# Patient Record
Sex: FEMALE | Race: WHITE | NOT HISPANIC OR LATINO | URBAN - METROPOLITAN AREA
[De-identification: names, ages, dates, MRNs, and addresses within clinical notes are randomized per-mention and may not be internally consistent; named-entity substitution may affect disease eponyms.]

---

## 2020-12-09 ENCOUNTER — NEW PATIENT (OUTPATIENT)
Dept: URBAN - METROPOLITAN AREA CLINIC 85 | Facility: CLINIC | Age: 75
End: 2020-12-09
Payer: OTHER GOVERNMENT

## 2020-12-09 PROCEDURE — 92004 COMPRE OPH EXAM NEW PT 1/>: CPT | Performed by: OPHTHALMOLOGY

## 2020-12-09 ASSESSMENT — INTRAOCULAR PRESSURE
OD: 17
OS: 21

## 2020-12-09 ASSESSMENT — VISUAL ACUITY
OS: 20/20
OD: 20/20

## 2020-12-09 ASSESSMENT — KERATOMETRY
OD: 43.00
OS: 43.50

## 2020-12-17 ENCOUNTER — NEW PATIENT (OUTPATIENT)
Dept: URBAN - METROPOLITAN AREA CLINIC 85 | Facility: CLINIC | Age: 75
End: 2020-12-17
Payer: OTHER GOVERNMENT

## 2020-12-17 DIAGNOSIS — H26.491 OTHER SECONDARY CATARACT, RIGHT EYE: Primary | ICD-10-CM

## 2020-12-17 PROCEDURE — 99203 OFFICE O/P NEW LOW 30 MIN: CPT | Performed by: PHYSICIAN ASSISTANT

## 2020-12-28 ENCOUNTER — SURGERY (OUTPATIENT)
Dept: URBAN - METROPOLITAN AREA SURGERY 55 | Facility: SURGERY | Age: 75
End: 2020-12-28
Payer: OTHER GOVERNMENT

## 2020-12-28 PROCEDURE — 66821 AFTER CATARACT LASER SURGERY: CPT | Performed by: OPHTHALMOLOGY

## 2021-01-11 ENCOUNTER — NEW PATIENT (OUTPATIENT)
Dept: URBAN - METROPOLITAN AREA CLINIC 85 | Facility: CLINIC | Age: 76
End: 2021-01-11
Payer: OTHER GOVERNMENT

## 2021-01-11 DIAGNOSIS — H52.4 PRESBYOPIA: Primary | ICD-10-CM

## 2021-01-11 ASSESSMENT — VISUAL ACUITY
OD: 20/20
OS: 20/30

## 2021-03-03 ENCOUNTER — FOLLOW UP ESTABLISHED (OUTPATIENT)
Dept: URBAN - METROPOLITAN AREA CLINIC 85 | Facility: CLINIC | Age: 76
End: 2021-03-03
Payer: OTHER GOVERNMENT

## 2021-03-03 DIAGNOSIS — H26.492 OTHER SECONDARY CATARACT, LEFT EYE: Primary | ICD-10-CM

## 2021-03-03 PROCEDURE — 92012 INTRM OPH EXAM EST PATIENT: CPT | Performed by: OPHTHALMOLOGY

## 2021-03-03 ASSESSMENT — VISUAL ACUITY
OD: 20/20
OS: 20/30

## 2021-03-03 ASSESSMENT — INTRAOCULAR PRESSURE
OS: 19
OD: 17

## 2021-03-04 ENCOUNTER — ADULT PHYSICAL (OUTPATIENT)
Dept: URBAN - METROPOLITAN AREA CLINIC 85 | Facility: CLINIC | Age: 76
End: 2021-03-04
Payer: OTHER GOVERNMENT

## 2021-03-04 DIAGNOSIS — Z01.818 ENCOUNTER FOR OTHER PREPROCEDURAL EXAMINATION: Primary | ICD-10-CM

## 2021-03-04 PROCEDURE — 99213 OFFICE O/P EST LOW 20 MIN: CPT | Performed by: PHYSICIAN ASSISTANT

## 2021-03-24 ENCOUNTER — SURGERY (OUTPATIENT)
Dept: URBAN - METROPOLITAN AREA SURGERY 55 | Facility: SURGERY | Age: 76
End: 2021-03-24
Payer: OTHER GOVERNMENT

## 2021-03-24 PROCEDURE — 66821 AFTER CATARACT LASER SURGERY: CPT | Performed by: OPHTHALMOLOGY

## 2021-04-14 ENCOUNTER — OFFICE VISIT (OUTPATIENT)
Dept: URBAN - METROPOLITAN AREA CLINIC 85 | Facility: CLINIC | Age: 76
End: 2021-04-14
Payer: OTHER GOVERNMENT

## 2021-04-14 ASSESSMENT — VISUAL ACUITY
OS: 20/25
OD: 20/20

## 2022-12-15 ENCOUNTER — OFFICE VISIT (OUTPATIENT)
Dept: URBAN - METROPOLITAN AREA CLINIC 82 | Facility: CLINIC | Age: 77
End: 2022-12-15
Payer: OTHER GOVERNMENT

## 2022-12-15 DIAGNOSIS — H35.3131 BILATERAL NONEXUDATIVE AGE-RELATED MACULAR DEGENERATION, EARLY DRY STAGE: Primary | ICD-10-CM

## 2022-12-15 DIAGNOSIS — Z96.1 PRESENCE OF INTRAOCULAR LENS: ICD-10-CM

## 2022-12-15 DIAGNOSIS — R51.9 HEADACHE: ICD-10-CM

## 2022-12-15 PROCEDURE — 99213 OFFICE O/P EST LOW 20 MIN: CPT | Performed by: OPTOMETRIST

## 2022-12-15 ASSESSMENT — KERATOMETRY
OS: 43.00
OD: 42.63

## 2022-12-15 ASSESSMENT — INTRAOCULAR PRESSURE
OS: 24
OD: 18

## 2022-12-15 ASSESSMENT — VISUAL ACUITY
OS: 20/25
OD: 20/30

## 2022-12-15 NOTE — IMPRESSION/PLAN
Impression: Bilateral nonexudative age-related macular degeneration, early dry stage: H35.3131. Plan: Educated pt on exam findings today, discussed limited posterior segment view on undilated pupil and without photos. Pt declined both, strongly encouraged pt to follow up 1-2 months for fundus photos. Recommended pt stop smoking. Recommended diet of green leafy vegetables and sunglasses while outside. Discussed home [facility] (AG) monitoring QD and demonstrated use in office. Pt. given AG for home monitoring. Pt. instructed to call ASAP if acute VA change or change on AG, as that may indicate conversion to exudative macular degeneration. Pt declined dilation today. Educated pt the periphery of the back of the eye was unable to be assessed without dilation or photos, pt expressed understanding. Educated pt on signs/symptoms of RDs and RTC ASAP if changes in vision occur. RTC 1-2 months for optos photos, or PRN.

## 2022-12-15 NOTE — IMPRESSION/PLAN
Impression: Headache: R51.9. Plan: Discussed pt's headache complaints. Encouraged pt to follow up with PCP regarding headaches.

## 2022-12-15 NOTE — IMPRESSION/PLAN
Impression: Presence of intraocular lens: Z96.1. Plan: Educated pt on exam findings. Did not update SRx as minimal changes found and pt not desiring to update glasses. Monitor yearly.

## 2024-09-18 ENCOUNTER — OFFICE VISIT (OUTPATIENT)
Dept: URBAN - METROPOLITAN AREA CLINIC 85 | Facility: CLINIC | Age: 79
End: 2024-09-18
Payer: OTHER GOVERNMENT

## 2024-09-18 DIAGNOSIS — H18.593 OTHER HEREDITARY CORNEAL DYSTROPHIES: ICD-10-CM

## 2024-09-18 DIAGNOSIS — H35.3131 NONEXUDATIVE AGE-RELATED MACULAR DEGENERATION, BILATERAL, EARLY DRY STAGE: Primary | ICD-10-CM

## 2024-09-18 DIAGNOSIS — H04.123 DRY EYE SYNDROME OF BILATERAL LACRIMAL GLANDS: ICD-10-CM

## 2024-09-18 DIAGNOSIS — H43.812 VITREOUS DEGENERATION, LEFT EYE: ICD-10-CM

## 2024-09-18 DIAGNOSIS — H43.391 OTHER VITREOUS OPACITIES, RIGHT EYE: ICD-10-CM

## 2024-09-18 PROCEDURE — 92134 CPTRZ OPH DX IMG PST SGM RTA: CPT | Performed by: OPTOMETRIST

## 2024-09-18 PROCEDURE — 92014 COMPRE OPH EXAM EST PT 1/>: CPT | Performed by: OPTOMETRIST

## 2024-09-18 ASSESSMENT — VISUAL ACUITY: OD: 20/25

## 2024-09-18 ASSESSMENT — INTRAOCULAR PRESSURE
OD: 18
OS: 21